# Patient Record
Sex: FEMALE | Race: WHITE | Employment: FULL TIME | URBAN - NONMETROPOLITAN AREA
[De-identification: names, ages, dates, MRNs, and addresses within clinical notes are randomized per-mention and may not be internally consistent; named-entity substitution may affect disease eponyms.]

---

## 2019-09-19 ENCOUNTER — APPOINTMENT (OUTPATIENT)
Dept: OCCUPATIONAL MEDICINE | Facility: OTHER | Age: 19
End: 2019-09-19

## 2019-09-19 PROCEDURE — 99000 SPECIMEN HANDLING OFFICE-LAB: CPT

## 2020-04-13 ENCOUNTER — NURSE TRIAGE (OUTPATIENT)
Dept: NURSING | Facility: OTHER | Age: 20
End: 2020-04-13

## 2020-04-13 NOTE — TELEPHONE ENCOUNTER
COVID 19 Nurse Triage Plan/Patient Instructions    Please be aware that novel coronavirus (COVID-19) may be circulating in the community. If you develop symptoms such as fever, cough, or SOB or if you have concerns about the presence of another infection including coronavirus (COVID-19), please contact your health care provider or visit [email or web address].     Disposition/Instructions  Patient called and stated she was exposed to COVID 19 positive person.  She denies symptoms and was asking about work and testing.  Information given and instructed on signs and symptoms to watch for and report any changes.  She stated she would call with any changes and isolate herself if symptoms appear.  She will check her temperature daily.    Additional COVID19 information to add for patients.     Additional General Information About COVID-19    COVID-19 - General Information:  Regardless of if you have been tested or not:  Patient who have symptoms (cough, fever, or shortness of breath), need to isolate for 7 days from when symptoms started AND 72 hours after fever resolves (without fever reducing medications) AND improvement of respiratory symptoms (whichever is longer).      Isolate yourself at home (in own room/own bathroom if possible)    Do Not allow any visitors    Do Not go to work or school    Do Not go to [employer],  centers, shopping, or other public places.    Do Not shake hands.    Avoid close and intimate contact with others (hugging, kissing).    Follow CDC recommendations for household cleaning of frequently touched services.     After the initial 7 days, continue to isolate yourself from household members as much as possible. To continue decrease the risk of community spread and exposure, you and any members of your household should limit activities in public for 14 days after starting home isolation.     You can reference the following CDC link for helpful home isolation/care  tips:  https://www.cdc.gov/coronavirus/2019-ncov/downloads/10Things.pdf    COVID-19 - Symptoms:     The COVID-19 can cause a respiratory illness, such as bronchitis or pneumonia.    The most common symptoms are: cough, fever, and shortness of breath.     Other symptoms are: body aches, chills, diarrhea, fatigue, headache, runny nose, and sore throat     COVID-19 - Exposure Risk Factors:    Exposure to a person who has been diagnosed with COVID-19 .    Travel from an area with recent local transmission of COVID-19 .    The CDC (www.cdc.gov) has the most up-to-date list of where the COVID-19 outbreak is occurring.    COVID-19 - Spreading:     The virus likely spreads through respiratory droplets produced when a person coughs or sneezes. These respiratory droplets can travel approximately 6 feet and can remain on surfaces.  Common disinfectants will kill the virus.    The CDC currently does not recommend healthy people wearing masks.    COVID-19 - Protect Yourself:     Avoid close contact with people known to have this new COVID-19 infection.    Wash hands often with soap and water or alcohol-based hand .    Avoid touching the eyes, nose or mouth.       Thank you for limiting contact with others, wearing a simple mask to cover your cough, practice good hand hygiene habits and accessing our virtual services where possible to limit the spread of this virus.    For more information about COVID19 and options for caring for yourself at home, please visit the CDC website at https://www.cdc.gov/coronavirus/2019-ncov/about/steps-when-sick.html  For more options for care at [facility], please visit our website at [email or web address]    For more information, please use the [place] Department of Health ([facility]) COVID-19 Hotlines (Interpreters available):     Health questions: Phone Number: [phone] or [phone] and Hours: 7 a.m. to 7 p.m.    Schools and  questions:  Phone Number: [phone] or [phone] and Hours 7 a.m. to 7 p.m.                    Reason for Disposition    [1] COVID-19 exposure AND [2] no symptoms    COVID-19, questions about    COVID-19 Home Isolation, questions about    COVID-19 Prevention and Healthy Living, questions about    COVID-19 Testing, questions about    Additional Information    Negative: SEVERE difficulty breathing (e.g., struggling for each breath, speaks in single words)    Negative: Difficult to awaken or acting confused (e.g., disoriented, slurred speech)    Negative: Bluish (or gray) lips or face now    Negative: Shock suspected (e.g., cold/pale/clammy skin, too weak to stand, low BP, rapid pulse)    Negative: Sounds like a life-threatening emergency to the triager    Negative: SEVERE or constant chest pain (Exception: mild central chest pain, present only when coughing)    Negative: MODERATE difficulty breathing (e.g., speaks in phrases, SOB even at rest, pulse 100-120)    Negative: Patient sounds very sick or weak to the triager    Negative: MILD difficulty breathing (e.g., minimal/no SOB at rest, SOB with walking, pulse <100)    Negative: Chest pain    Negative: Fever > 103 F (39.4 C)    Negative: [1] Fever > 101 F (38.3 C) AND [2] age > 60    Negative: [1] Fever > 100.0 F (37.8 C) AND [2] bedridden (e.g., nursing home patient, CVA, chronic illness, recovering from surgery)    Negative: HIGH RISK patient (e.g., age > 64 years, diabetes, heart or lung disease, weak immune system)    Negative: Fever present > 3 days (72 hours)    Negative: [1] Fever returns after gone for over 24 hours AND [2] symptoms worse or not improved    Negative: [1] Continuous (nonstop) coughing interferes with work or school AND [2] no improvement using cough treatment per protocol    Negative: Cough present > 3 weeks    Protocols used: CORONAVIRUS (COVID-19) DIAGNOSED OR [id number] 3.30.20

## 2020-11-30 ENCOUNTER — HOSPITAL ENCOUNTER (EMERGENCY)
Facility: HOSPITAL | Age: 20
Discharge: HOME | End: 2020-11-30
Attending: NURSE PRACTITIONER | Admitting: NURSE PRACTITIONER
Payer: COMMERCIAL

## 2020-11-30 VITALS
RESPIRATION RATE: 16 BRPM | HEART RATE: 116 BPM | SYSTOLIC BLOOD PRESSURE: 130 MMHG | DIASTOLIC BLOOD PRESSURE: 83 MMHG | TEMPERATURE: 99.4 F | OXYGEN SATURATION: 97 %

## 2020-11-30 DIAGNOSIS — M26.609 TMJ (TEMPOROMANDIBULAR JOINT SYNDROME): ICD-10-CM

## 2020-11-30 PROCEDURE — G0463 HOSPITAL OUTPT CLINIC VISIT: HCPCS | Mod: 25

## 2020-11-30 PROCEDURE — 96372 THER/PROPH/DIAG INJ SC/IM: CPT | Performed by: NURSE PRACTITIONER

## 2020-11-30 PROCEDURE — 250N000011 HC RX IP 250 OP 636: Performed by: NURSE PRACTITIONER

## 2020-11-30 PROCEDURE — 99214 OFFICE O/P EST MOD 30 MIN: CPT | Performed by: NURSE PRACTITIONER

## 2020-11-30 RX ORDER — KETOROLAC TROMETHAMINE 30 MG/ML
30 INJECTION, SOLUTION INTRAMUSCULAR; INTRAVENOUS ONCE
Status: COMPLETED | OUTPATIENT
Start: 2020-11-30 | End: 2020-11-30

## 2020-11-30 RX ORDER — FLUTICASONE PROPIONATE 50 MCG
1 SPRAY, SUSPENSION (ML) NASAL DAILY
Qty: 9.9 ML | Refills: 0 | Status: SHIPPED | OUTPATIENT
Start: 2020-11-30

## 2020-11-30 RX ADMIN — KETOROLAC TROMETHAMINE 30 MG: 30 INJECTION, SOLUTION INTRAMUSCULAR at 16:14

## 2020-11-30 ASSESSMENT — ENCOUNTER SYMPTOMS
VOMITING: 0
FACIAL ASYMMETRY: 0
SINUS PAIN: 0
FEVER: 0
LIGHT-HEADEDNESS: 0
SHORTNESS OF BREATH: 0
COUGH: 0
EYES NEGATIVE: 1
DIZZINESS: 0
SORE THROAT: 0
NAUSEA: 0
CHILLS: 0
SINUS PRESSURE: 0
ACTIVITY CHANGE: 1

## 2020-11-30 NOTE — DISCHARGE INSTRUCTIONS
NSAIDS for acute management -- For patients with persistent TMD pain, we suggest using an NSAID as first-line pharmacologic therapy. We generally treat patients with a 10- to 14-day course of a long-acting NSAID (eg, naproxen 250 to 500 mg orally twice daily) [104]. We encourage patients to take the lowest effective dose of an NSAID for the shortest period of time.  May use acetaminophen 650 to 1000 mg every four to six hours as needed for pain that is not controlled by Naproxen.  If you continue to have discomfort, being reevaluated by your dentist, chiropractor, or physical therapy may be necessary.

## 2020-11-30 NOTE — ED PROVIDER NOTES
History     Chief Complaint   Patient presents with     Temporomandibular Joint Pain     HPI  Cari Queen is a 20 year old female who presents with left sided jaw pain that started two days ago. This is accompanied with left side ear pain.The pain is the worse when she opens her mouth. She saw her dentist two weeks ago and had right sided jaw pain at that time. The dentist did not see any concerning findings. Her jaw pain resolved on its own. She took 800 mg of ibuprofen this morning without relief. Smoker. Denies fevers, chills, nausea, vomiting, and shortness of breath.    Allergies:  No Known Allergies    Problem List:    There are no active problems to display for this patient.       Past Medical History:    History reviewed. No pertinent past medical history.    Past Surgical History:    History reviewed. No pertinent surgical history.    Family History:    History reviewed. No pertinent family history.    Social History:  Marital Status:  Single [1]  Social History     Tobacco Use     Smoking status: None   Substance Use Topics     Alcohol use: None     Drug use: None        Medications:    No current outpatient medications on file.        Review of Systems   Constitutional: Positive for activity change. Negative for chills and fever.   HENT: Positive for ear pain. Negative for ear discharge, sinus pressure, sinus pain and sore throat.         Tmj pain   Eyes: Negative.    Respiratory: Negative for cough and shortness of breath.    Gastrointestinal: Negative for nausea and vomiting.   Skin: Negative.    Neurological: Negative for dizziness, facial asymmetry and light-headedness.       Physical Exam   BP: 130/83  Pulse: 116  Temp: 99.4  F (37.4  C)  Resp: 16  SpO2: 97 %      Physical Exam  Vitals signs and nursing note reviewed.   Constitutional:       General: She is in acute distress (mild to moderate when she opens her mouth).   HENT:      Head: Normocephalic.      Right Ear: Tympanic membrane and  ear canal normal.      Left Ear: Tympanic membrane and ear canal normal.      Nose: Mucosal edema (slight) present.      Right Turbinates: Swollen.      Left Turbinates: Swollen.      Right Sinus: No maxillary sinus tenderness or frontal sinus tenderness.      Left Sinus: No maxillary sinus tenderness or frontal sinus tenderness.      Mouth/Throat:      Lips: Pink.      Mouth: Mucous membranes are moist.      Dentition: Normal dentition. No dental tenderness, gingival swelling, dental caries, dental abscesses or gum lesions.      Pharynx: Oropharynx is clear. Uvula midline. No posterior oropharyngeal erythema.      Comments: Tenderness when she opened her mouth half open.  Eyes:      Conjunctiva/sclera: Conjunctivae normal.   Neck:      Musculoskeletal: Normal range of motion and neck supple. No muscular tenderness.   Cardiovascular:      Rate and Rhythm: Tachycardia present.   Pulmonary:      Effort: Pulmonary effort is normal.   Musculoskeletal:         General: Tenderness present.      Comments: Over left TMJ. No clicking felt when she opened her mouth.   Lymphadenopathy:      Cervical: No cervical adenopathy.   Skin:     General: Skin is warm and dry.      Capillary Refill: Capillary refill takes less than 2 seconds.   Neurological:      Mental Status: She is alert and oriented to person, place, and time.   Psychiatric:         Behavior: Behavior normal.         ED Course        Procedures             No results found for this or any previous visit (from the past 24 hour(s)).    Medications   ketorolac (TORADOL) injection 30 mg (30 mg Intramuscular Given 11/30/20 1614)       Assessments & Plan (with Medical Decision Making)     I have reviewed the nursing notes.    I have reviewed the findings, diagnosis, plan and need for follow up with the patient.  (M26.282) TMJ (temporomandibular joint syndrome)  Comment: 20 year old female who presents with left sided jaw pain that started two days ago. This is accompanied  with left side ear pain.The pain is the worse when she opens her mouth. She saw her dentist two weeks ago and had right sided jaw pain at that time. The dentist did not see any concerning findings. Her jaw pain resolved on its own. She took 800 mg of ibuprofen this morning without relief. Smoker. Denies fevers, chills, nausea, vomiting, and shortness of breath.    Assessment: assessment negative except for discomfort when she opens her mouth half way.    Ketorolac 30 mg given IM.    Plan: education provided for TMD self care and pain relief methods. Flonase daily for enlarged nasal turbinates/mucosal edema. Sinus tachycardia. Instructed not to take pseudoephedrine.  NSAIDS for acute management -- For patients with persistent TMD pain, we suggest using an NSAID as first-line pharmacologic therapy. We generally treat patients with a 10- to 14-day course of a long-acting NSAID (eg, naproxen 250 to 500 mg orally twice daily) [104]. We encourage patients to take the lowest effective dose of an NSAID for the shortest period of time. Verbally instructed not to take any further NSAIDS until after ten pm tonight.  May use acetaminophen 650 to 1000 mg every four to six hours as needed for pain that is not controlled by Naproxen.  If you continue to have discomfort, being reevaluated by your dentist, chiropractor, or physical therapy may be necessary.   These discharge instructions and medications were reviewed with her and understanding verbalized.    New Prescriptions    No medications on file       Final diagnoses:   TMJ (temporomandibular joint syndrome)       11/30/2020   [facility]       Margy Marvin, ALLEN  11/30/20 [id number]

## 2020-11-30 NOTE — ED AVS SNAPSHOT
[facility]  [address] [address]  Phone: [phone]                                    Cari Queen   MRN: [id number]    Department: [facility]   Date of Visit: 11/30/2020           After Visit Summary Signature Page    I have received my discharge instructions, and my questions have been answered. I have discussed any challenges I see with this plan with the nurse or doctor.    ..........................................................................................................................................  Patient/Patient Representative Signature      ..........................................................................................................................................  Patient Representative Print Name and Relationship to Patient    ..................................................               ................................................  Date                                   Time    ..........................................................................................................................................  Reviewed by Signature/Title    ...................................................              ..............................................  Date                                               Time          22EPIC Rev 08/18

## 2020-11-30 NOTE — ED TRIAGE NOTES
Pt presents today with c/o left side jaw pain, started 2 days ago. Getting worse per pt. Rates pain at 8/10. PT has tried ibuprofen, not helping.

## 2021-01-05 ENCOUNTER — ALLIED HEALTH/NURSE VISIT (OUTPATIENT)
Dept: FAMILY MEDICINE | Facility: OTHER | Age: 21
End: 2021-01-05
Attending: FAMILY MEDICINE
Payer: COMMERCIAL

## 2021-01-05 DIAGNOSIS — R05.9 COUGH: Primary | ICD-10-CM

## 2021-01-05 PROCEDURE — C9803 HOPD COVID-19 SPEC COLLECT: HCPCS

## 2021-01-05 PROCEDURE — U0003 INFECTIOUS AGENT DETECTION BY NUCLEIC ACID (DNA OR RNA); SEVERE ACUTE RESPIRATORY SYNDROME CORONAVIRUS 2 (SARS-COV-2) (CORONAVIRUS DISEASE [COVID-19]), AMPLIFIED PROBE TECHNIQUE, MAKING USE OF HIGH THROUGHPUT TECHNOLOGIES AS DESCRIBED BY CMS-2020-01-R: HCPCS | Mod: ZL | Performed by: FAMILY MEDICINE

## 2021-01-05 PROCEDURE — U0005 INFEC AGEN DETEC AMPLI PROBE: HCPCS | Mod: ZL | Performed by: FAMILY MEDICINE

## 2021-01-05 NOTE — PROGRESS NOTES
Chief Complaint   Patient presents with     Covid 19 Testing     Cough    Medication Reconciliation: complete    Juany Vivas, SITAN

## 2021-01-06 LAB
SARS-COV-2 RNA RESP QL NAA+PROBE: NOT DETECTED
SPECIMEN SOURCE: NORMAL